# Patient Record
Sex: MALE | NOT HISPANIC OR LATINO | URBAN - METROPOLITAN AREA
[De-identification: names, ages, dates, MRNs, and addresses within clinical notes are randomized per-mention and may not be internally consistent; named-entity substitution may affect disease eponyms.]

---

## 2021-11-22 ENCOUNTER — EMERGENCY (EMERGENCY)
Facility: HOSPITAL | Age: 41
LOS: 0 days | Discharge: HOME | End: 2021-11-23
Attending: STUDENT IN AN ORGANIZED HEALTH CARE EDUCATION/TRAINING PROGRAM
Payer: MEDICAID

## 2021-11-22 VITALS
OXYGEN SATURATION: 98 % | HEART RATE: 92 BPM | TEMPERATURE: 98 F | DIASTOLIC BLOOD PRESSURE: 90 MMHG | WEIGHT: 214.95 LBS | HEIGHT: 68 IN | RESPIRATION RATE: 16 BRPM | SYSTOLIC BLOOD PRESSURE: 136 MMHG

## 2021-11-22 DIAGNOSIS — F10.929 ALCOHOL USE, UNSPECIFIED WITH INTOXICATION, UNSPECIFIED: ICD-10-CM

## 2021-11-22 PROCEDURE — 99284 EMERGENCY DEPT VISIT MOD MDM: CPT

## 2021-11-22 PROCEDURE — 93010 ELECTROCARDIOGRAM REPORT: CPT

## 2021-11-22 NOTE — ED ADULT TRIAGE NOTE - CHIEF COMPLAINT QUOTE
BIBA,  found on ground, neighbor gave 8mg narcan-Inhalation, pt woke,  pinpoint pupils, red-eyes,  pt denies drug use.  pt admits to drinking 2 glasses of vodka.  remembers wife cooking dinner and cannot recall anything else. BIBA,  found on ground, neighbor gave 8mg narcan-Inhalation, pt woke,  pinpoint pupils, red-eyes,  pt denies drug use.  pt admits to drinking 2 glasses of vodka.  remembers wife cooking dinner and cannot recall anything else.  pt ambulatory in ED.

## 2021-11-23 VITALS
HEART RATE: 68 BPM | DIASTOLIC BLOOD PRESSURE: 64 MMHG | SYSTOLIC BLOOD PRESSURE: 108 MMHG | RESPIRATION RATE: 15 BRPM | OXYGEN SATURATION: 98 % | TEMPERATURE: 99 F

## 2021-11-23 PROCEDURE — 70450 CT HEAD/BRAIN W/O DYE: CPT | Mod: 26,MA

## 2021-11-23 NOTE — ED ADULT NURSE NOTE - CHIEF COMPLAINT QUOTE
BIBA,  found on ground, neighbor gave 8mg narcan-Inhalation, pt woke,  pinpoint pupils, red-eyes,  pt denies drug use.  pt admits to drinking 2 glasses of vodka.  remembers wife cooking dinner and cannot recall anything else.  pt ambulatory in ED.

## 2021-11-23 NOTE — ED ADULT NURSE NOTE - OBJECTIVE STATEMENT
received a year old patient A&Ox3. Brought ion by EMS for possible opiate abuse and was given narcan on the side walk. Patient denies any drug usage patient states he just drank vodka.   no signs of acute distress. bliar mckeon. patient placed on cardiac monitor and pulse ox No truama noted to the head. Patient is speaking in full sentences. Patient denies chest pain, nausea, vomiting, abd pain, fever, chills , LOC. Patient states

## 2021-11-23 NOTE — ED PROVIDER NOTE - OBJECTIVE STATEMENT
41 year old M with no pertinent PMHx presents to the ED BIBEMS for suspected opiate abuse. As per EMS pt was found on the sidewalk and given Narcan in the field. Pt reports he drank a half pint of vodka but denies illicit drug use. Pt in the ED has no complaints at this time. Denies head strike or LOC. Pt reports in the ED he wants to go home with family.

## 2021-11-23 NOTE — ED PROVIDER NOTE - GASTROINTESTINAL, MLM
Expected Date Of Service: 06/06/2019 Bill For Surgical Tray: no Billing Type: Third-Party Bill Abdomen soft, non-tender, no guarding.

## 2021-11-23 NOTE — ED PROVIDER NOTE - PATIENT PORTAL LINK FT
You can access the FollowMyHealth Patient Portal offered by [facility] by registering at the following website: [email or web address]. By joining [facility]’s FollowMyHealth portal, you will also be able to view your health information using other applications (apps) compatible with our system.

## 2021-11-23 NOTE — ED PROVIDER NOTE - CLINICAL SUMMARY MEDICAL DECISION MAKING FREE TEXT BOX
41 year old M presenting to the ED BIBEMS for suspected opiate abuse, Narcan given in field with good response, denies illicit drug use, pt is alert, oriented and conversational with vitals stable, will monitor for signs of rebound and toxicity but likely d/c home with family.